# Patient Record
Sex: MALE | Race: WHITE
[De-identification: names, ages, dates, MRNs, and addresses within clinical notes are randomized per-mention and may not be internally consistent; named-entity substitution may affect disease eponyms.]

---

## 2018-04-30 ENCOUNTER — HOSPITAL ENCOUNTER (OUTPATIENT)
Dept: HOSPITAL 62 - RAD | Age: 33
Discharge: HOME | End: 2018-04-30
Attending: INTERNAL MEDICINE
Payer: OTHER GOVERNMENT

## 2018-04-30 VITALS — DIASTOLIC BLOOD PRESSURE: 74 MMHG | SYSTOLIC BLOOD PRESSURE: 118 MMHG

## 2018-04-30 DIAGNOSIS — R79.89: Primary | ICD-10-CM

## 2018-04-30 DIAGNOSIS — K76.0: ICD-10-CM

## 2018-04-30 LAB
APTT BLD: 25.5 SEC (ref 23.5–35.8)
BUN SERPL-MCNC: 13 MG/DL (ref 7–20)
ERYTHROCYTE [DISTWIDTH] IN BLOOD BY AUTOMATED COUNT: 12.4 % (ref 11.5–14)
HCT VFR BLD CALC: 44.3 % (ref 37.9–51)
HGB BLD-MCNC: 15.4 G/DL (ref 13.5–17)
INR PPP: 0.89
MCH RBC QN AUTO: 30.6 PG (ref 27–33.4)
MCHC RBC AUTO-ENTMCNC: 34.7 G/DL (ref 32–36)
MCV RBC AUTO: 88 FL (ref 80–97)
PLATELET # BLD: 138 10^3/UL (ref 150–450)
PROTHROMBIN TIME: 12.5 SEC (ref 11.4–15.4)
RBC # BLD AUTO: 5.02 10^6/UL (ref 4.35–5.55)
WBC # BLD AUTO: 5.5 10^3/UL (ref 4–10.5)

## 2018-04-30 PROCEDURE — 88305 TISSUE EXAM BY PATHOLOGIST: CPT

## 2018-04-30 PROCEDURE — 85027 COMPLETE CBC AUTOMATED: CPT

## 2018-04-30 PROCEDURE — 85610 PROTHROMBIN TIME: CPT

## 2018-04-30 PROCEDURE — 82565 ASSAY OF CREATININE: CPT

## 2018-04-30 PROCEDURE — 77012 CT SCAN FOR NEEDLE BIOPSY: CPT

## 2018-04-30 PROCEDURE — 47000 NEEDLE BIOPSY OF LIVER PERQ: CPT

## 2018-04-30 PROCEDURE — 88313 SPECIAL STAINS GROUP 2: CPT

## 2018-04-30 PROCEDURE — 85730 THROMBOPLASTIN TIME PARTIAL: CPT

## 2018-04-30 PROCEDURE — 84520 ASSAY OF UREA NITROGEN: CPT

## 2018-04-30 PROCEDURE — 36415 COLL VENOUS BLD VENIPUNCTURE: CPT

## 2018-04-30 NOTE — RADIOLOGY REPORT (SQ)
EXAM DESCRIPTION:  CT BIOPSY LIVER; CT NEEDLE PLACEMENT



COMPLETED DATE/TIME:  4/30/2018 11:54 am; 4/30/2018 11:53 am



REASON FOR STUDY:  ELEVATED ALT; ELEVATED ALT, LIVER BX



COMPARISON:  None.



TECHNIQUE:  After obtaining informed consent, the patient was brought to the CT suite and was placed 
supine on the CT gurney. The patient was prepped and draped in the usual sterile fashion .  Axial mirian
ges were obtained for targeting of theright lobe liver. An appropriate access site was selected. IV s
edation was administered and physician direction by the registered nurse using 2 milligrams of Versed
 and 75 micrograms of fentanyl. Physiologic monitoring was provided before, during, and after sedatio
n. The total sedation time was 30 minutes.

Documentation face to face time, the performing proceduralist, spent monitoring the patient: 5minutes
.

 Noncontrasted CT of the liver was performed to localize an approach for the right lobe liver biopsy.
  A percutaneous site was marked.  Time out was performed.

 After skin prep and local lidocaine for skin and deep tissue anesthesia, a coaxial 18/19 gauge biops
y needle system was used to obtain several cores of tissue from the right lobe liver.  These were sub
mitted to the lab in formalin.  Biopsy tract was embolized with 3 Gel-Foam plugs.  No immediate postp
rocedure complications.

Total of 1.7 seconds of CT fluoro was used.

All CT scanners at this facility use dose modulation, iterative reconstruction, and/or weight based d
osing when appropriate to reduce radiation dose to as low as reasonably achievable (ALARA).

[facility]: Dose Right  [facility]: CareDose    [facility]: Dose Right    [facility]: Teradose 4D    [facility]: Smart Technologies



RADIATION DOSE:  CT Rad equipment meets quality standard of care and radiation dose reduction techniq
ues were employed. CTDIvol: 20.1 mGy. DLP: 418 mGy-cm. mGy.



LIMITATIONS:  None.



FINDINGS:  CT guided liver biopsy as detailed above.



IMPRESSION:   CT GUIDED RIGHT LOBE LIVER BIOPSY PERFORMED AS ABOVE.  PATHOLOGY PENDING.  NO IMMEDIATE
 COMPLICATIONS.

IV conscious sedation



COMMENT:  Patient medication list reviewed:Yes- Quality ID# 130:Eligible professional attests to docu
menting in the medical record they obtained, updated, or reviewed the patient's current medications..


Quality :  Final reports for procedures using fluoroscopy that document radiation exposure eric
hilda, or exposure time and number of fluorographic images (if radiation exposure indices are not avail
able)



TECHNICAL DOCUMENTATION:  JOB ID:  [id number]

 Quality ID # 436: Final reports with documentation of one or more dose reduction techniques (e.g., A
utomated exposure control, adjustment of the mA and/or kV according to patient size, use of iterative
 reconstruction technique)

 2011 [employer]- All Rights Reserved



Reading location - IP/workstation name: [facility]

## 2018-04-30 NOTE — RADIOLOGY REPORT (SQ)
EXAM DESCRIPTION:  CT BIOPSY LIVER; CT NEEDLE PLACEMENT



COMPLETED DATE/TIME:  4/30/2018 11:54 am; 4/30/2018 11:53 am



REASON FOR STUDY:  ELEVATED ALT; ELEVATED ALT, LIVER BX



COMPARISON:  None.



TECHNIQUE:  After obtaining informed consent, the patient was brought to the CT suite and was placed 
supine on the CT gurney. The patient was prepped and draped in the usual sterile fashion .  Axial mirian
ges were obtained for targeting of theright lobe liver. An appropriate access site was selected. IV s
edation was administered and physician direction by the registered nurse using 2 milligrams of Versed
 and 75 micrograms of fentanyl. Physiologic monitoring was provided before, during, and after sedatio
n. The total sedation time was 30 minutes.

Documentation face to face time, the performing proceduralist, spent monitoring the patient: 5minutes
.

 Noncontrasted CT of the liver was performed to localize an approach for the right lobe liver biopsy.
  A percutaneous site was marked.  Time out was performed.

 After skin prep and local lidocaine for skin and deep tissue anesthesia, a coaxial 18/19 gauge biops
y needle system was used to obtain several cores of tissue from the right lobe liver.  These were sub
mitted to the lab in formalin.  Biopsy tract was embolized with 3 Gel-Foam plugs.  No immediate postp
rocedure complications.

Total of 1.7 seconds of CT fluoro was used.

All CT scanners at this facility use dose modulation, iterative reconstruction, and/or weight based d
osing when appropriate to reduce radiation dose to as low as reasonably achievable (ALARA).

[facility]: Dose Right  [facility]: CareDose    [facility]: Dose Right    [facility]: Teradose 4D    [facility]: [employer]



RADIATION DOSE:  CT Rad equipment meets quality standard of care and radiation dose reduction techniq
ues were employed. CTDIvol: 20.1 mGy. DLP: 418 mGy-cm. mGy.



LIMITATIONS:  None.



FINDINGS:  CT guided liver biopsy as detailed above.



IMPRESSION:   CT GUIDED RIGHT LOBE LIVER BIOPSY PERFORMED AS ABOVE.  PATHOLOGY PENDING.  NO IMMEDIATE
 COMPLICATIONS.

IV conscious sedation



COMMENT:  Patient medication list reviewed:Yes- Quality ID# 130:Eligible professional attests to docu
menting in the medical record they obtained, updated, or reviewed the patient's current medications..


Quality :  Final reports for procedures using fluoroscopy that document radiation exposure eric
hilda, or exposure time and number of fluorographic images (if radiation exposure indices are not avail
able)



TECHNICAL DOCUMENTATION:  JOB ID:  [id number]

 Quality ID # 436: Final reports with documentation of one or more dose reduction techniques (e.g., A
utomated exposure control, adjustment of the mA and/or kV according to patient size, use of iterative
 reconstruction technique)

 2011 [employer]- All Rights Reserved



Reading location - IP/workstation name: [facility]